# Patient Record
Sex: MALE | Race: ASIAN | NOT HISPANIC OR LATINO | URBAN - METROPOLITAN AREA
[De-identification: names, ages, dates, MRNs, and addresses within clinical notes are randomized per-mention and may not be internally consistent; named-entity substitution may affect disease eponyms.]

---

## 2020-01-07 ENCOUNTER — APPOINTMENT (RX ONLY)
Dept: URBAN - METROPOLITAN AREA CLINIC 158 | Facility: CLINIC | Age: 65
Setting detail: DERMATOLOGY
End: 2020-01-07

## 2020-01-07 DIAGNOSIS — L40.0 PSORIASIS VULGARIS: ICD-10-CM | Status: INADEQUATELY CONTROLLED

## 2020-01-07 PROCEDURE — 99203 OFFICE O/P NEW LOW 30 MIN: CPT

## 2020-01-07 PROCEDURE — ? COUNSELING

## 2020-01-07 PROCEDURE — ? PATIENT SPECIFIC COUNSELING

## 2020-01-07 PROCEDURE — ? ORDER TESTS

## 2020-01-07 ASSESSMENT — LOCATION DETAILED DESCRIPTION DERM
LOCATION DETAILED: RIGHT PROXIMAL PRETIBIAL REGION
LOCATION DETAILED: LEFT PROXIMAL POSTERIOR THIGH
LOCATION DETAILED: LEFT PROXIMAL PRETIBIAL REGION
LOCATION DETAILED: RIGHT DISTAL CALF
LOCATION DETAILED: RIGHT ELBOW
LOCATION DETAILED: RIGHT DISTAL POSTERIOR THIGH
LOCATION DETAILED: LEFT DISTAL CALF
LOCATION DETAILED: RIGHT BUTTOCK
LOCATION DETAILED: LEFT BUTTOCK
LOCATION DETAILED: LEFT ELBOW

## 2020-01-07 ASSESSMENT — BSA PSORIASIS: % BODY COVERED IN PSORIASIS: 30

## 2020-01-07 ASSESSMENT — PGA PSORIASIS: PGA PSORIASIS: MODERATE (MODERATE PLAQUE ELEVATION, MODERATE ERYTHEMA, COARSE SCALE PREDOMINATES)

## 2020-01-07 ASSESSMENT — LOCATION SIMPLE DESCRIPTION DERM
LOCATION SIMPLE: RIGHT PRETIBIAL REGION
LOCATION SIMPLE: LEFT POSTERIOR THIGH
LOCATION SIMPLE: RIGHT ELBOW
LOCATION SIMPLE: LEFT ELBOW
LOCATION SIMPLE: LEFT CALF
LOCATION SIMPLE: RIGHT POSTERIOR THIGH
LOCATION SIMPLE: RIGHT BUTTOCK
LOCATION SIMPLE: LEFT PRETIBIAL REGION
LOCATION SIMPLE: RIGHT CALF
LOCATION SIMPLE: LEFT BUTTOCK

## 2020-01-07 ASSESSMENT — LOCATION ZONE DERM
LOCATION ZONE: ARM
LOCATION ZONE: TRUNK
LOCATION ZONE: LEG

## 2020-01-07 NOTE — PROCEDURE: PATIENT SPECIFIC COUNSELING
Detail Level: Detailed
Patient had moved here from [place] in July 2019 and is here to establish dermatologic care.  He has a long-standing history of psoriasis.  He was most recently on Stelara for 2 years under the care of the a dermatologist in [place] with the last injection in June 2019.  His Stelara dose was 45mg and he weighs 160lb.  When he was on Stelara, the psoriasis was under very good control without any side effects.  Prior to Stelara, he had treatment with Enbrel, phototherapy, and a steroid ointment.  Exam show psoriasis plaques over the scalp, face, arms, legs, and back.  There is approximately 30% BSA involved.  Patient wishes to go back on Stelara. I will submit PA/Prescription form into [facility] specialty pharmacy.  Printed order given for CBC. CMP, and Quantiferon Gold TB test.  Return to clinic in 3 months assuming Stelara will be covered.  If [facility] is unable to service the patient, then will need to use [facility] per patient/wife.

## 2020-05-26 ENCOUNTER — APPOINTMENT (RX ONLY)
Dept: URBAN - METROPOLITAN AREA CLINIC 158 | Facility: CLINIC | Age: 65
Setting detail: DERMATOLOGY
End: 2020-05-26

## 2020-05-26 DIAGNOSIS — L40.0 PSORIASIS VULGARIS: ICD-10-CM | Status: WELL CONTROLLED

## 2020-05-26 PROCEDURE — 99213 OFFICE O/P EST LOW 20 MIN: CPT

## 2020-05-26 PROCEDURE — ? PATIENT SPECIFIC COUNSELING

## 2020-05-26 PROCEDURE — ? COUNSELING

## 2020-05-26 ASSESSMENT — LOCATION DETAILED DESCRIPTION DERM
LOCATION DETAILED: RIGHT BUTTOCK
LOCATION DETAILED: RIGHT ELBOW
LOCATION DETAILED: LEFT BUTTOCK
LOCATION DETAILED: LEFT ELBOW
LOCATION DETAILED: RIGHT DISTAL POSTERIOR THIGH
LOCATION DETAILED: LEFT PROXIMAL POSTERIOR THIGH
LOCATION DETAILED: LEFT DISTAL CALF
LOCATION DETAILED: RIGHT DISTAL CALF
LOCATION DETAILED: RIGHT PROXIMAL PRETIBIAL REGION
LOCATION DETAILED: LEFT PROXIMAL PRETIBIAL REGION

## 2020-05-26 ASSESSMENT — LOCATION SIMPLE DESCRIPTION DERM
LOCATION SIMPLE: RIGHT PRETIBIAL REGION
LOCATION SIMPLE: LEFT PRETIBIAL REGION
LOCATION SIMPLE: RIGHT CALF
LOCATION SIMPLE: LEFT ELBOW
LOCATION SIMPLE: LEFT POSTERIOR THIGH
LOCATION SIMPLE: RIGHT ELBOW
LOCATION SIMPLE: RIGHT POSTERIOR THIGH
LOCATION SIMPLE: LEFT CALF
LOCATION SIMPLE: LEFT BUTTOCK
LOCATION SIMPLE: RIGHT BUTTOCK

## 2020-05-26 ASSESSMENT — LOCATION ZONE DERM
LOCATION ZONE: ARM
LOCATION ZONE: TRUNK
LOCATION ZONE: LEG

## 2020-05-26 ASSESSMENT — PGA PSORIASIS: PGA PSORIASIS 2020: CLEAR

## 2020-05-26 NOTE — PROCEDURE: PATIENT SPECIFIC COUNSELING
Detail Level: Detailed
Carry forward from 1/7/2020: Patient had moved here from [place] in July 2019 and is here to establish dermatologic care.  He has a long-standing history of psoriasis.  He was most recently on Stelara for 2 years under the care of the a dermatologist in [place] with the last injection in June 2019.  His Stelara dose was 45mg and he weighs 160lb.  When he was on Stelara, the psoriasis was under very good control without any side effects.  Prior to Stelara, he had treatment with Enbrel, phototherapy, and a steroid ointment.  Exam show psoriasis plaques over the scalp, face, arms, legs, and back.  There is approximately 30% BSA involved.  Patient wishes to go back on Stelara. I will submit PA/Prescription form into [facility].  Printed order given for CBC. CMP, and Quantiferon Gold TB test.  Return to clinic in 3 months assuming Stelara will be covered.  If [employer] is unable to service the patient, then will need to use [facility] per patient/wife.\\n\\nTODAY's note (May 26 2020): Psoriasis has cleared since he restarted Stelara 45mg every 12 weeks back in Jan 2020.  Exam shows not active psoriasis lesions.  He denies any side effects including no illness, fever, or chills.  Labs from 1/17/2020 reviewed.  He is getting St[facility].  Stelara approved from 1/16/20 to 1/16/21 (see approval letter in attachement).  Return to clinic in 6 months.

## 2020-10-16 ENCOUNTER — NEW PATIENT (OUTPATIENT)
Dept: URBAN - METROPOLITAN AREA CLINIC 56 | Facility: CLINIC | Age: 65
End: 2020-10-16
Payer: OTHER GOVERNMENT

## 2020-10-16 DIAGNOSIS — H40.013 OPEN ANGLE WITH BORDERLINE FINDINGS, LOW RISK, BILATERAL: Primary | ICD-10-CM

## 2020-10-16 PROCEDURE — 92004 COMPRE OPH EXAM NEW PT 1/>: CPT | Performed by: OPTOMETRIST

## 2020-10-16 PROCEDURE — 92134 CPTRZ OPH DX IMG PST SGM RTA: CPT | Performed by: OPTOMETRIST

## 2020-10-16 PROCEDURE — 92250 FUNDUS PHOTOGRAPHY W/I&R: CPT | Performed by: OPTOMETRIST

## 2020-10-16 ASSESSMENT — KERATOMETRY
OD: 42.59
OS: 42.37

## 2020-10-16 ASSESSMENT — INTRAOCULAR PRESSURE
OD: 13
OS: 12

## 2020-10-16 ASSESSMENT — VISUAL ACUITY
OD: 20/20
OS: 20/20

## 2020-12-16 ENCOUNTER — APPOINTMENT (RX ONLY)
Dept: URBAN - METROPOLITAN AREA CLINIC 158 | Facility: CLINIC | Age: 65
Setting detail: DERMATOLOGY
End: 2020-12-16

## 2020-12-16 DIAGNOSIS — L40.0 PSORIASIS VULGARIS: ICD-10-CM | Status: WELL CONTROLLED

## 2020-12-16 PROCEDURE — 99213 OFFICE O/P EST LOW 20 MIN: CPT

## 2020-12-16 PROCEDURE — ? COUNSELING

## 2020-12-16 PROCEDURE — ? ORDER TESTS

## 2020-12-16 PROCEDURE — ? PATIENT SPECIFIC COUNSELING

## 2020-12-16 ASSESSMENT — LOCATION ZONE DERM: LOCATION ZONE: ARM

## 2020-12-16 ASSESSMENT — LOCATION SIMPLE DESCRIPTION DERM
LOCATION SIMPLE: RIGHT ELBOW
LOCATION SIMPLE: LEFT ELBOW

## 2020-12-16 ASSESSMENT — LOCATION DETAILED DESCRIPTION DERM
LOCATION DETAILED: RIGHT ELBOW
LOCATION DETAILED: LEFT ELBOW

## 2020-12-16 NOTE — PROCEDURE: PATIENT SPECIFIC COUNSELING
Detail Level: Detailed
Carry forward from 1/7/2020: Patient had moved here from [place] in July 2019 and is here to establish dermatologic care.  He has a long-standing history of psoriasis.  He was most recently on Stelara for 2 years under the care of the a dermatologist [place] with the last injection in June 2019.  His Stelara dose was 45mg and he weighs 160lb.  When he was on Stelara, the psoriasis was under very good control without any side effects.  Prior to Stelara, he had treatment with Enbrel, phototherapy, and a steroid ointment.  Exam show psoriasis plaques over the scalp, face, arms, legs, and back.  There is approximately 30% BSA involved.  Patient wishes to go back on Stelara. I will submit PA/Prescription form into [facility] specialty pharmacy.  Printed order given for CBC. CMP, and Quantiferon Gold TB test.  Return to clinic in 3 months assuming Stelara will be covered.  If [employer] is unable to service the patient, then will need to use [facility] per patient/wife.\\n\\nCarry forward from (May 26 2020): Psoriasis has cleared since he restarted Stelara 45mg every 12 weeks back in Jan 2020.  Exam shows not active psoriasis lesions.  He denies any side effects including no illness, fever, or chills.  Labs from 1/17/2020 reviewed.  He is getting Stelara via [facility] specialty pharmacy.  Stelara approved from 1/16/20 to 1/16/21 (see approval letter in attachement).  Return to clinic in 6 months.\\n\\nTODAY's note (Dec 16 2020): Psoriasis is well controlled on Stelara 45mg every 12 weeks.  He denies any side effects.  Exam just shows two small plaques on the elbows.  Printed order given for his yearly QuantiFERON-TB Gold Plus.  [facility] is [facility].  Return to clinic in 6 months.

## 2021-03-30 ENCOUNTER — RX ONLY (OUTPATIENT)
Age: 66
Setting detail: RX ONLY
End: 2021-03-30

## 2021-03-30 RX ORDER — USTEKINUMAB 45 MG/.5ML
1 INJECTION, SOLUTION SUBCUTANEOUS
Qty: 1 | Refills: 6 | Status: CANCELLED
Stop reason: CLARIF

## 2021-06-16 ENCOUNTER — APPOINTMENT (RX ONLY)
Dept: URBAN - METROPOLITAN AREA CLINIC 158 | Facility: CLINIC | Age: 66
Setting detail: DERMATOLOGY
End: 2021-06-16

## 2021-06-16 DIAGNOSIS — L40.0 PSORIASIS VULGARIS: ICD-10-CM | Status: WELL CONTROLLED

## 2021-06-16 PROCEDURE — ? STELARA MONITORING

## 2021-06-16 PROCEDURE — ? PATIENT SPECIFIC COUNSELING

## 2021-06-16 PROCEDURE — ? COUNSELING

## 2021-06-16 PROCEDURE — 99213 OFFICE O/P EST LOW 20 MIN: CPT

## 2021-06-16 PROCEDURE — ? ORDER TESTS

## 2021-06-16 ASSESSMENT — LOCATION DETAILED DESCRIPTION DERM: LOCATION DETAILED: RIGHT ANTERIOR MEDIAL MALLEOLUS

## 2021-06-16 ASSESSMENT — LOCATION ZONE DERM: LOCATION ZONE: LEG

## 2021-06-16 ASSESSMENT — LOCATION SIMPLE DESCRIPTION DERM: LOCATION SIMPLE: RIGHT ANKLE

## 2021-06-16 NOTE — PROCEDURE: PATIENT SPECIFIC COUNSELING
Detail Level: Detailed
Carry forward from 1/7/2020: Patient had moved here from [place] in July 2019 and is here to establish dermatologic care.  He has a long-standing history of psoriasis.  He was most recently on Stelara for 2 years under the care of the a dermatologist in [place] with the last injection in June 2019.  His Stelara dose was 45mg and he weighs 160lb.  When he was on Stelara, the psoriasis was under very good control without any side effects.  Prior to Stelara, he had treatment with Enbrel, phototherapy, and a steroid ointment.  Exam show psoriasis plaques over the scalp, face, arms, legs, and back.  There is approximately 30% BSA involved.  Patient wishes to go back on Stelara. I will submit PA/Prescription form into [facility] specialty pharmacy.  Printed order given for CBC. CMP, and Quantiferon Gold TB test.  Return to clinic in 3 months assuming Stelara will be covered.  If [employer] is unable to service the patient, then will need to use [facility] per patient/wife.\\n\\nCarry forward from (May 26 2020): Psoriasis has cleared since he restarted Stelara 45mg every 12 weeks back in Jan 2020.  Exam shows not active psoriasis lesions.  He denies any side effects including no illness, fever, or chills.  Labs from 1/17/2020 reviewed.  He is getting Stel[facility].  Stelara approved from 1/16/20 to 1/16/21 (see approval letter in attachement).  Return to clinic in 6 months.\\n\\nCarry forward from (Dec 16 2020): Psoriasis is well controlled on Stelara 45mg every 12 weeks.  He denies any side effects.  Exam just shows two small plaques on the elbows.  Printed order given for his yearly QuantiFERON-TB Gold Plus.  Patient is getting [facility].  Return to clinic in 6 months.\\n\\nTODAY's note (Jun 16 2021): Psoriasis is well controlled on Stelara 45mg every 12 weeks.  He denies any side effects.  Exam just shows small plaques on the right ankle.  Printed order given for his yearly QuantiFERON-TB Gold Plus; he did not get the lab test drawn after last visit.  Patient is getting Stel[facility].  Return to clinic in 6 months

## 2022-01-28 ENCOUNTER — APPOINTMENT (RX ONLY)
Dept: URBAN - METROPOLITAN AREA CLINIC 158 | Facility: CLINIC | Age: 67
Setting detail: DERMATOLOGY
End: 2022-01-28

## 2022-01-28 DIAGNOSIS — L40.0 PSORIASIS VULGARIS: ICD-10-CM

## 2022-01-28 PROCEDURE — ? STELARA MONITORING

## 2022-01-28 PROCEDURE — ? COUNSELING

## 2022-01-28 PROCEDURE — 96372 THER/PROPH/DIAG INJ SC/IM: CPT

## 2022-01-28 PROCEDURE — ? STELARA INJECTION

## 2022-01-28 PROCEDURE — ? PATIENT SPECIFIC COUNSELING

## 2022-01-28 ASSESSMENT — LOCATION SIMPLE DESCRIPTION DERM
LOCATION SIMPLE: ABDOMEN
LOCATION SIMPLE: RIGHT ANKLE

## 2022-01-28 ASSESSMENT — LOCATION ZONE DERM
LOCATION ZONE: LEG
LOCATION ZONE: TRUNK

## 2022-01-28 ASSESSMENT — LOCATION DETAILED DESCRIPTION DERM
LOCATION DETAILED: LEFT LATERAL ABDOMEN
LOCATION DETAILED: RIGHT ANTERIOR MEDIAL MALLEOLUS

## 2022-01-28 NOTE — PROCEDURE: PATIENT SPECIFIC COUNSELING
Detail Level: Detailed
Carry forward from 1/7/2020: Patient had moved here from [place] in July 2019 and is here to establish dermatologic care.  He has a long-standing history of psoriasis.  He was most recently on Stelara for 2 years under the care of the a dermatologist in [place] with the last injection in June 2019.  His Stelara dose was 45mg and he weighs 160lb.  When he was on Stelara, the psoriasis was under very good control without any side effects.  Prior to Stelara, he had treatment with Enbrel, phototherapy, and a steroid ointment.  Exam show psoriasis plaques over the scalp, face, arms, legs, and back.  There is approximately 30% BSA involved.  Patient wishes to go back on Stelara. I will submit PA/Prescription form into [facility] specialty pharmacy.  Printed order given for CBC. CMP, and Quantiferon Gold TB test.  Return to clinic in 3 months assuming Stelara will be covered.  If [employer] is unable to service the patient, then will need to use [facility] per patient/wife.\\n\\nCarry forward from (May 26 2020): Psoriasis has cleared since he restarted Stelara 45mg every 12 weeks back in Jan 2020.  Exam shows not active psoriasis lesions.  He denies any side effects including no illness, fever, or chills.  Labs from 1/17/2020 reviewed.  He is getting Stelara via [facility] specialty pharmacy.  Stelara approved from 1/16/20 to 1/16/21 (see approval letter in attachement).  Return to clinic in 6 months.\\n\\nCarry forward from (Dec 16 2020): Psoriasis is well controlled on Stelara 45mg every 12 weeks.  He denies any side effects.  Exam just shows two small plaques on the elbows.  Printed order given for his yearly QuantiFERON-TB Gold Plus.  Patient is getting [facility].  Return to clinic in 6 months.\\n\\nCarry forward from (Jun 16 2021): Psoriasis is well controlled on Stelara 45mg every 12 weeks.  He denies any side effects.  Exam just shows small plaques on the right ankle.  Printed order given for his yearly QuantiFERON-TB Gold Plus; he did not get the lab test drawn after last visit.  Patient is getting Stel[facility].  Return to clinic in 6 months\\n\\Adilson's note (Jan 28 2022): Psoriasis is well controlled on Stelara 45mg every 12 weeks.  He denies any side effects.  Exam just shows no active lesions.  QuantiFERON-TB Gold Plus was negative on 6/16/2021. Due to a change in his insurance, Stelara now has to be sent here to the office for injection in order to be covered by his insurance.  The medication is being [facility]; medication was shipped here earlier this week.  Return to clinic in 3 months.  I instructed patient and [facility] to [facility] to arrange [facility] of the medication prior to the next visit.

## 2022-01-28 NOTE — PROCEDURE: STELARA INJECTION
Was The Medication Purchased By The Clinic?: No
Ndc (45mg Syringe): [address]-04
Stelara Amount: 45 mg
Render If Medication Purchased By Clinic In Visit Note?: Yes
Consent: The risks of pain and injection site reactions were reviewed with the patient prior to the injection.
Administered By (Optional): Dr. Cruz
Detail Level: None
Ndc (90mg Syringe): [address]-49
J-Code:

## 2022-03-18 ENCOUNTER — OFFICE VISIT (OUTPATIENT)
Dept: URBAN - METROPOLITAN AREA CLINIC 56 | Facility: CLINIC | Age: 67
End: 2022-03-18
Payer: MEDICARE

## 2022-03-18 DIAGNOSIS — H11.041 PERIPHERAL PTERYGIUM, STATIONARY, RIGHT EYE: ICD-10-CM

## 2022-03-18 DIAGNOSIS — H04.123 DRY EYE SYNDROME OF BILATERAL LACRIMAL GLANDS: ICD-10-CM

## 2022-03-18 DIAGNOSIS — H25.13 AGE-RELATED NUCLEAR CATARACT, BILATERAL: ICD-10-CM

## 2022-03-18 DIAGNOSIS — H43.813 VITREOUS DEGENERATION, BILATERAL: ICD-10-CM

## 2022-03-18 PROCEDURE — 92133 CPTRZD OPH DX IMG PST SGM ON: CPT | Performed by: OPTOMETRIST

## 2022-03-18 PROCEDURE — 92014 COMPRE OPH EXAM EST PT 1/>: CPT | Performed by: OPTOMETRIST

## 2022-03-18 ASSESSMENT — VISUAL ACUITY
OD: 20/20
OS: 20/20

## 2022-03-18 ASSESSMENT — KERATOMETRY
OD: 2149.18
OS: 42.37

## 2022-03-18 ASSESSMENT — INTRAOCULAR PRESSURE
OS: 13
OD: 14

## 2022-03-18 NOTE — IMPRESSION/PLAN
Impression: Dry eye syndrome of bilateral lacrimal glands: H04.123. Plan: Continue ATs QID OU Start dry warm compresses daily for 5 min.

## 2022-03-18 NOTE — IMPRESSION/PLAN
Impression: Open angle with borderline findings, low risk, bilateral
  (-) FHx Plan: IOP and testing are stable. Will not start treatment, see yearly. 
RTC 1 year CE with OCT RNFL

## 2022-03-18 NOTE — IMPRESSION/PLAN
Impression: Peripheral pterygium, stationary, right eye Plan: Not affecting vision. Recommend UV protection and ATs. Monitor.

## 2022-04-29 ENCOUNTER — APPOINTMENT (RX ONLY)
Dept: URBAN - METROPOLITAN AREA CLINIC 158 | Facility: CLINIC | Age: 67
Setting detail: DERMATOLOGY
End: 2022-04-29

## 2022-04-29 DIAGNOSIS — L82.1 OTHER SEBORRHEIC KERATOSIS: ICD-10-CM

## 2022-04-29 DIAGNOSIS — L40.0 PSORIASIS VULGARIS: ICD-10-CM

## 2022-04-29 PROCEDURE — ? STELARA MONITORING

## 2022-04-29 PROCEDURE — ? STELARA INJECTION

## 2022-04-29 PROCEDURE — ? COUNSELING

## 2022-04-29 PROCEDURE — 99213 OFFICE O/P EST LOW 20 MIN: CPT

## 2022-04-29 PROCEDURE — ? PATIENT SPECIFIC COUNSELING

## 2022-04-29 ASSESSMENT — LOCATION DETAILED DESCRIPTION DERM
LOCATION DETAILED: LEFT INFERIOR LATERAL UPPER BACK
LOCATION DETAILED: RIGHT DISTAL POSTERIOR UPPER ARM
LOCATION DETAILED: LEFT INFERIOR CENTRAL MALAR CHEEK
LOCATION DETAILED: LEFT SUPERIOR UPPER BACK
LOCATION DETAILED: LEFT PROXIMAL POSTERIOR UPPER ARM
LOCATION DETAILED: RIGHT MID-UPPER BACK
LOCATION DETAILED: LEFT LATERAL ABDOMEN
LOCATION DETAILED: RIGHT INFERIOR UPPER BACK
LOCATION DETAILED: RIGHT ANTERIOR PROXIMAL THIGH
LOCATION DETAILED: EPIGASTRIC SKIN
LOCATION DETAILED: RIGHT SUPERIOR PARIETAL SCALP
LOCATION DETAILED: RIGHT MEDIAL INFERIOR CHEST
LOCATION DETAILED: LEFT ANTERIOR PROXIMAL THIGH
LOCATION DETAILED: RIGHT INFERIOR CENTRAL MALAR CHEEK
LOCATION DETAILED: RIGHT ANTERIOR MEDIAL MALLEOLUS

## 2022-04-29 ASSESSMENT — LOCATION SIMPLE DESCRIPTION DERM
LOCATION SIMPLE: LEFT UPPER ARM
LOCATION SIMPLE: RIGHT THIGH
LOCATION SIMPLE: ABDOMEN
LOCATION SIMPLE: LEFT THIGH
LOCATION SIMPLE: CHEST
LOCATION SIMPLE: RIGHT UPPER ARM
LOCATION SIMPLE: RIGHT UPPER BACK
LOCATION SIMPLE: LEFT CHEEK
LOCATION SIMPLE: RIGHT CHEEK
LOCATION SIMPLE: LEFT UPPER BACK
LOCATION SIMPLE: SCALP
LOCATION SIMPLE: RIGHT ANKLE

## 2022-04-29 ASSESSMENT — LOCATION ZONE DERM
LOCATION ZONE: TRUNK
LOCATION ZONE: FACE
LOCATION ZONE: LEG
LOCATION ZONE: SCALP
LOCATION ZONE: ARM

## 2022-04-29 NOTE — PROCEDURE: STELARA INJECTION
Was The Medication Purchased By The Clinic?: No
Ndc (45mg Syringe): [address]-32
Stelara Amount: 45 mg
Render If Medication Purchased By Clinic In Visit Note?: Yes
Consent: The risks of pain and injection site reactions were reviewed with the patient prior to the injection.
Administered By (Optional): Dr. Cruz
Lot # (Optional): [id number]
Expiration Date (Optional): 9/2024
Detail Level: None
Ndc (90mg Syringe): [address]-57
J-Code:

## 2022-04-29 NOTE — PROCEDURE: PATIENT SPECIFIC COUNSELING
Detail Level: Detailed
Carry forward from 1/7/2020: Patient had moved here from [place] in July 2019 and is here to establish dermatologic care.  He has a long-standing history of psoriasis.  He was most recently on Stelara for 2 years under the care of the a dermatologist in [place] with the last injection in June 2019.  His Stelara dose was 45mg and he weighs 160lb.  When he was on Stelara, the psoriasis was under very good control without any side effects.  Prior to Stelara, he had treatment with Enbrel, phototherapy, and a steroid ointment.  Exam show psoriasis plaques over the scalp, face, arms, legs, and back.  There is approximately 30% BSA involved.  Patient wishes to go back on Stelara. I will submit PA/Prescription form into [facility] specialty pharmacy.  Printed order given for CBC. CMP, and Quantiferon Gold TB test.  Return to clinic in 3 months assuming Stelara will be covered.  If [employer] is unable to service the patient, then will need to use [facility] per patient/wife.\\n\\nCarry forward from (May 26 2020): Psoriasis has cleared since he restarted Stelara 45mg every 12 weeks back in Jan 2020.  Exam shows not active psoriasis lesions.  He denies any side effects including no illness, fever, or chills.  Labs from 1/17/2020 reviewed.  He is getting Stelara via [facility] specialty pharmacy.  Stelara approved from 1/16/20 to 1/16/21 (see approval letter in attachement).  Return to clinic in 6 months.\\n\\nCarry forward from (Dec 16 2020): Psoriasis is well controlled on Stelara 45mg every 12 weeks.  He denies any side effects.  Exam just shows two small plaques on the elbows.  Printed order given for his yearly QuantiFERON-TB Gold Plus.  Patient is getting [facility].  Return to clinic in 6 months.\\n\\nCarry forward from (Jun 16 2021): Psoriasis is well controlled on Stelara 45mg every 12 weeks.  He denies any side effects.  Exam just shows small plaques on the right ankle.  Printed order given for his yearly QuantiFERON-TB Gold Plus; he did not get the lab test drawn after last visit.  Patient is getting Stel[facility].  Return to clinic in 6 months\\n\\nCarry forward from (Jan 28 2022): Psoriasis is well controlled on Stelara 45mg every 12 weeks.  He denies any side effects.  Exam just shows no active lesions.  QuantiFERON-TB Gold Plus was negative on 6/16/2021. Due to a change in his insurance, Stelara now has to be sent here to the office for injection in order to be covered by his insurance.  The medication is being supplied [facility]; medication was shipped here earlier this week.  Return to clinic in 3 months.  I instructed patient and wife to [facility] to arrange shipment of the medication prior to the next visit.\\n\\nTODAY's note (Apr 29 2022): Patient is here for his Stelara injection.  Condition is well controlled with no active psoriasis plaques on exam. Patient instructed to contact [facility] to have medication shipped to our office prior to follow-up appointment in 12 weeks.

## 2022-07-29 ENCOUNTER — APPOINTMENT (RX ONLY)
Dept: URBAN - METROPOLITAN AREA CLINIC 158 | Facility: CLINIC | Age: 67
Setting detail: DERMATOLOGY
End: 2022-07-29

## 2022-07-29 DIAGNOSIS — L40.0 PSORIASIS VULGARIS: ICD-10-CM

## 2022-07-29 PROCEDURE — ? STELARA MONITORING

## 2022-07-29 PROCEDURE — 99213 OFFICE O/P EST LOW 20 MIN: CPT

## 2022-07-29 PROCEDURE — ? COUNSELING

## 2022-07-29 PROCEDURE — ? PATIENT SPECIFIC COUNSELING

## 2022-07-29 PROCEDURE — ? STELARA INJECTION

## 2022-07-29 ASSESSMENT — LOCATION SIMPLE DESCRIPTION DERM
LOCATION SIMPLE: RIGHT UPPER ARM
LOCATION SIMPLE: LEFT UPPER ARM
LOCATION SIMPLE: RIGHT THIGH
LOCATION SIMPLE: ABDOMEN
LOCATION SIMPLE: LEFT THIGH
LOCATION SIMPLE: CHEST

## 2022-07-29 ASSESSMENT — LOCATION ZONE DERM
LOCATION ZONE: LEG
LOCATION ZONE: ARM
LOCATION ZONE: TRUNK

## 2022-07-29 ASSESSMENT — LOCATION DETAILED DESCRIPTION DERM
LOCATION DETAILED: RIGHT MEDIAL INFERIOR CHEST
LOCATION DETAILED: LEFT ANTERIOR PROXIMAL THIGH
LOCATION DETAILED: PERIUMBILICAL SKIN
LOCATION DETAILED: RIGHT ANTERIOR PROXIMAL THIGH
LOCATION DETAILED: RIGHT DISTAL POSTERIOR UPPER ARM
LOCATION DETAILED: LEFT PROXIMAL POSTERIOR UPPER ARM
LOCATION DETAILED: EPIGASTRIC SKIN

## 2022-07-29 NOTE — PROCEDURE: PATIENT SPECIFIC COUNSELING
Detail Level: Detailed
Carry forward from 1/7/2020: Patient had moved here from [place] in July 2019 and is here to establish dermatologic care.  He has a long-standing history of psoriasis.  He was most recently on Stelara for 2 years under the care of the a dermatologist in [place] with the last injection in June 2019.  His Stelara dose was 45mg and he weighs 160lb.  When he was on Stelara, the psoriasis was under very good control without any side effects.  Prior to Stelara, he had treatment with Enbrel, phototherapy, and a steroid ointment.  Exam show psoriasis plaques over the scalp, face, arms, legs, and back.  There is approximately 30% BSA involved.  Patient wishes to go back on Stelara. I will submit PA/Prescription form into [facility] specialty pharmacy.  Printed order given for CBC. CMP, and Quantiferon Gold TB test.  Return to clinic in 3 months assuming Stelara will be covered.  If [facility] is unable to service the patient, then will need to use [facility] per patient/wife.\\n\\nCarry forward from (May 26 2020): Psoriasis has cleared since he restarted Stelara 45mg every 12 weeks back in Jan 2020.  Exam shows not active psoriasis lesions.  He denies any side effects including no illness, fever, or chills.  Labs from 1/17/2020 reviewed.  He is getting Stelara via [facility] specialty pharmacy.  Stelara approved from 1/16/20 to 1/16/21 (see approval letter in attachement).  Return to clinic in 6 months.\\n\\nCarry forward from (Dec 16 2020): Psoriasis is well controlled on Stelara 45mg every 12 weeks.  He denies any side effects.  Exam just shows two small plaques on the elbows.  Printed order given for his yearly QuantiFERON-TB Gold Plus.  Patient is getting [facility].  Return to clinic in 6 months.\\n\\nCarry forward from (Jun 16 2021): Psoriasis is well controlled on Stelara 45mg every 12 weeks.  He denies any side effects.  Exam just shows small plaques on the right ankle.  Printed order given for his yearly QuantiFERON-TB Gold Plus; he did not get the lab test drawn after last visit.  Patient is getting Stel[facility].  Return to clinic in 6 months\\n\\nCarry forward from (Jan 28 2022): Psoriasis is well controlled on Stelara 45mg every 12 weeks.  He denies any side effects.  Exam just shows no active lesions.  QuantiFERON-TB Gold Plus was negative on 6/16/2021. Due to a change in his insurance, Stelara now has to be sent here to the office for injection in order to be covered by his insurance.  The medication is being [facility]; medication was shipped here earlier this week.  Return to clinic in 3 months.  I instructed patient and wife to [facility] to arrange shipment of the medication prior to the next visit.\\n\\nCarry forward from (Apr 29 2022): Patient is here for his Stelara injection.  Condition is well controlled with no active psoriasis plaques on exam. Patient instructed to contact [facility] to have medication shipped to our office prior to follow-up appointment in 12 weeks.\\n\\nTODAY's note (Jul 29 2022): The patient is here for his Stelara injection.  Condition is well controlled with no active psoriasis plaques on the exam. Patient was instructed to contact [facility] to have medication shipped to our office prior to follow-up appointment in 12 weeks.  Patient reports that he had a \"skin reaction\" that he described as hives on the arms and abdomen that appeared 10 days ago while he was in [place].  He went to the ED and was given hydroxyzine.  The eruption has resolved and there is no active hives on exam.

## 2022-07-29 NOTE — PROCEDURE: STELARA INJECTION
Was The Medication Purchased By The Clinic?: No
Ndc (45mg Syringe): [address]-57
Stelara Amount: 45 mg
Render If Medication Purchased By Clinic In Visit Note?: Yes
Consent: The risks of pain and injection site reactions were reviewed with the patient prior to the injection.
Administered By (Optional): Dr. Cruz
Lot # (Optional): [id number]
Expiration Date (Optional): 10/2024
Detail Level: None
Ndc (90mg Syringe): [address]-49
J-Code:

## 2022-10-27 ENCOUNTER — APPOINTMENT (RX ONLY)
Dept: URBAN - METROPOLITAN AREA CLINIC 158 | Facility: CLINIC | Age: 67
Setting detail: DERMATOLOGY
End: 2022-10-27

## 2022-10-27 DIAGNOSIS — L40.0 PSORIASIS VULGARIS: ICD-10-CM

## 2022-10-27 PROCEDURE — ? PATIENT SPECIFIC COUNSELING

## 2022-10-27 PROCEDURE — ? STELARA MONITORING

## 2022-10-27 PROCEDURE — 99213 OFFICE O/P EST LOW 20 MIN: CPT

## 2022-10-27 PROCEDURE — ? STELARA INJECTION

## 2022-10-27 PROCEDURE — ? COUNSELING

## 2022-10-27 ASSESSMENT — LOCATION ZONE DERM
LOCATION ZONE: TRUNK
LOCATION ZONE: LEG
LOCATION ZONE: ARM

## 2022-10-27 ASSESSMENT — LOCATION SIMPLE DESCRIPTION DERM
LOCATION SIMPLE: RIGHT PRETIBIAL REGION
LOCATION SIMPLE: LEFT FOREARM
LOCATION SIMPLE: ABDOMEN

## 2022-10-27 ASSESSMENT — LOCATION DETAILED DESCRIPTION DERM
LOCATION DETAILED: LEFT PROXIMAL DORSAL FOREARM
LOCATION DETAILED: RIGHT DISTAL PRETIBIAL REGION
LOCATION DETAILED: RIGHT PROXIMAL PRETIBIAL REGION
LOCATION DETAILED: PERIUMBILICAL SKIN

## 2022-10-27 NOTE — PROCEDURE: STELARA INJECTION
Was The Medication Purchased By The Clinic?: No
Ndc (45mg Syringe): [address]-13
Stelara Amount: 45 mg
Render If Medication Purchased By Clinic In Visit Note?: Yes
Consent: The risks of pain and injection site reactions were reviewed with the patient prior to the injection.
Administered By (Optional): Dr. Cruz
Lot # (Optional): [id number]
Expiration Date (Optional): 10/2024
Detail Level: None
Ndc (90mg Syringe): [address]-00
J-Code:

## 2022-10-27 NOTE — PROCEDURE: PATIENT SPECIFIC COUNSELING
Detail Level: Detailed
Carry forward from 1/7/2020: Patient had moved here from [place] in July 2019 and is here to establish dermatologic care.  He has a long-standing history of psoriasis.  He was most recently on Stelara for 2 years under the care of the a dermatologist in [place] with the last injection in June 2019.  His Stelara dose was 45mg and he weighs 160lb.  When he was on Stelara, the psoriasis was under very good control without any side effects.  Prior to Stelara, he had treatment with Enbrel, phototherapy, and a steroid ointment.  Exam show psoriasis plaques over the scalp, face, arms, legs, and back.  There is approximately 30% BSA involved.  Patient wishes to go back on Stelara. I will submit PA/Prescription form into [facility] specialty pharmacy.  Printed order given for CBC. CMP, and Quantiferon Gold TB test.  Return to clinic in 3 months assuming Stelara will be covered.  If [employer] is unable to service the patient, then will need to use [facility] per patient/wife.\\n\\nCarry forward from (May 26 2020): Psoriasis has cleared since he restarted Stelara 45mg every 12 weeks back in Jan 2020.  Exam shows not active psoriasis lesions.  He denies any side effects including no illness, fever, or chills.  Labs from 1/17/2020 reviewed.  He is getting Stelara via [facility] specialty pharmacy.  Stelara approved from 1/16/20 to 1/16/21 (see approval letter in attachement).  Return to clinic in 6 months.\\n\\nCarry forward from (Dec 16 2020): Psoriasis is well controlled on Stelara 45mg every 12 weeks.  He denies any side effects.  Exam just shows two small plaques on the elbows.  Printed order given for his yearly QuantiFERON-TB Gold Plus.  Patient is getting [facility].  Return to clinic in 6 months.\\n\\nCarry forward from (Jun 16 2021): Psoriasis is well controlled on Stelara 45mg every 12 weeks.  He denies any side effects.  Exam just shows small plaques on the right ankle.  Printed order given for his yearly QuantiFERON-TB Gold Plus; he did not get the lab test drawn after last visit.  Patient is getting St[facility].  Return to clinic in 6 months\\n\\nCarry forward from (Jan 28 2022): Psoriasis is well controlled on Stelara 45mg every 12 weeks.  He denies any side effects.  Exam just shows no active lesions.  QuantiFERON-TB Gold Plus was negative on 6/16/2021. Due to a change in his insurance, Stelara now has to be sent here to the office for injection in order to be covered by his insurance.  The medication is being [facility]; medication was shipped here earlier this week.  Return to clinic in 3 months.  I instructed patient and [facility] to [facility] to arrange shipment of the medication prior to the next visit.\\n\\nCarry forward from (Apr 29 2022): Patient is here for his Stelara injection.  Condition is well controlled with no active psoriasis plaques on exam. Patient instructed to [facility] to have medication shipped to our office prior to follow-up appointment in 12 weeks.\\n\\nCarry forward from (Jul 29 2022): The patient is here for his Stelara injection.  Condition is well controlled with no active psoriasis plaques on the exam. Patient was instructed to [facility] to have medication shipped to our office prior to follow-up appointment in 12 weeks.  Patient reports that he had a \"skin reaction\" that he described as hives on the arms and abdomen that appeared 10 days ago while he was in [place].  He went to the ED and was given hydroxyzine.  The eruption has resolved and there is no active hives on exam.\\n\\nTODAY's note (Oct 27 2022): The patient is here for his Stelara injection.  Condition is well controlled with limited active psoriasis plaques on the left forearm and right lower leg. Patient was instructed to [facility] to have medication shipped to our office prior to follow-up appointment in 12 weeks.

## 2023-01-27 ENCOUNTER — APPOINTMENT (RX ONLY)
Dept: URBAN - METROPOLITAN AREA CLINIC 158 | Facility: CLINIC | Age: 68
Setting detail: DERMATOLOGY
End: 2023-01-27

## 2023-01-27 DIAGNOSIS — T07XXXA ABRASION OR FRICTION BURN OF OTHER, MULTIPLE, AND UNSPECIFIED SITES, WITHOUT MENTION OF INFECTION: ICD-10-CM

## 2023-01-27 DIAGNOSIS — L40.0 PSORIASIS VULGARIS: ICD-10-CM

## 2023-01-27 PROBLEM — S00.01XA ABRASION OF SCALP, INITIAL ENCOUNTER: Status: ACTIVE | Noted: 2023-01-27

## 2023-01-27 PROCEDURE — ? PATIENT SPECIFIC COUNSELING

## 2023-01-27 PROCEDURE — 99213 OFFICE O/P EST LOW 20 MIN: CPT

## 2023-01-27 PROCEDURE — ? STELARA INJECTION

## 2023-01-27 PROCEDURE — ? COUNSELING

## 2023-01-27 PROCEDURE — ? STELARA MONITORING

## 2023-01-27 ASSESSMENT — LOCATION SIMPLE DESCRIPTION DERM
LOCATION SIMPLE: RIGHT PRETIBIAL REGION
LOCATION SIMPLE: LEFT FOREARM
LOCATION SIMPLE: ABDOMEN
LOCATION SIMPLE: SCALP

## 2023-01-27 ASSESSMENT — LOCATION ZONE DERM
LOCATION ZONE: ARM
LOCATION ZONE: SCALP
LOCATION ZONE: LEG
LOCATION ZONE: TRUNK

## 2023-01-27 ASSESSMENT — LOCATION DETAILED DESCRIPTION DERM
LOCATION DETAILED: LEFT PROXIMAL DORSAL FOREARM
LOCATION DETAILED: RIGHT PROXIMAL PRETIBIAL REGION
LOCATION DETAILED: PERIUMBILICAL SKIN
LOCATION DETAILED: RIGHT SUPERIOR PARIETAL SCALP
LOCATION DETAILED: RIGHT DISTAL PRETIBIAL REGION

## 2023-01-27 NOTE — PROCEDURE: COUNSELING
Detail Level: Generalized
Quality 410: Psoriasis Clinical Response To Oral Systemic Or Biologic Mediations: Psoriasis Assessment Tool Documented, Met Specified Benchmark
Quality 337: Tuberculosis Prevention For Psoriasis And Psoriatic Arthritis Patients On A Biological Immune Response Modifier: Patient has a documented negative annual TB screening.
Patient Specific Counseling (Will Not Stick From Patient To Patient): \\nSample of Aquaphor ointment given.
Detail Level: Detailed

## 2023-01-27 NOTE — PROCEDURE: STELARA INJECTION
Was The Medication Purchased By The Clinic?: No
Ndc (45mg Syringe): [address]-11
Stelara Amount: 45 mg
Render If Medication Purchased By Clinic In Visit Note?: Yes
Consent: The risks of pain and injection site reactions were reviewed with the patient prior to the injection.
Administered By (Optional): Dr. Cruz
Lot # (Optional): [id number]
Expiration Date (Optional): 05/2025
Detail Level: None
Ndc (90mg Syringe): [address]-99
J-Code:

## 2023-01-27 NOTE — PROCEDURE: PATIENT SPECIFIC COUNSELING
Detail Level: Detailed
Carry forward from 1/7/2020: Patient had moved here from [place] in July 2019 and is here to establish dermatologic care.  He has a long-standing history of psoriasis.  He was most recently on Stelara for 2 years under the care of the a dermatologist in [place] with the last injection in June 2019.  His Stelara dose was 45mg and he weighs 160lb.  When he was on Stelara, the psoriasis was under very good control without any side effects.  Prior to Stelara, he had treatment with Enbrel, phototherapy, and a steroid ointment.  Exam show psoriasis plaques over the scalp, face, arms, legs, and back.  There is approximately 30% BSA involved.  Patient wishes to go back on Stelara. I will submit PA/Prescription form into [facility] specialty pharmacy.  Printed order given for CBC. CMP, and Quantiferon Gold TB test.  Return to clinic in 3 months assuming Stelara will be covered.  If [facility] is unable to service the patient, then will need to use [facility] per patient/wife.\\n\\nCarry forward from (May 26 2020): Psoriasis has cleared since he restarted Stelara 45mg every 12 weeks back in Jan 2020.  Exam shows not active psoriasis lesions.  He denies any side effects including no illness, fever, or chills.  Labs from 1/17/2020 reviewed.  He is getting Stelara via [facility] specialty pharmacy.  Stelara approved from 1/16/20 to 1/16/21 (see approval letter in attachement).  Return to clinic in 6 months.\\n\\nCarry forward from (Dec 16 2020): Psoriasis is well controlled on Stelara 45mg every 12 weeks.  He denies any side effects.  Exam just shows two small plaques on the elbows.  Printed order given for his yearly QuantiFERON-TB Gold Plus.  Patient is [facility].  Return to clinic in 6 months.\\n\\nCarry forward from (Jun 16 2021): Psoriasis is well controlled on Stelara 45mg every 12 weeks.  He denies any side effects.  Exam just shows small plaques on the right ankle.  Printed order given for his yearly QuantiFERON-TB Gold Plus; he did not get the lab test drawn after last visit.  Patient is getting [facility].  Return to clinic in 6 months\\n\\nCarry forward from (Jan 28 2022): Psoriasis is well controlled on Stelara 45mg every 12 weeks.  He denies any side effects.  Exam just shows no active lesions.  QuantiFERON-TB Gold Plus was negative on 6/16/2021. Due to a change in his insurance, Stelara now has to be sent here to the office for injection in order to be covered by his insurance.  The medication is being supplied [facility]; medication was shipped here earlier this week.  Return to clinic in 3 months.  I instructed [facility] and [facility] to [facility] of the medication prior to the next visit.\\n\\nCarry forward from (Apr 29 2022): Patient is here for his Stelara injection.  Condition is well controlled with no active psoriasis plaques on exam. Patient instructed to contact [facility] to have medication shipped to our office prior to follow-up appointment in 12 weeks.\\n\\nCarry forward from (Jul 29 2022): The patient is here for his Stelara injection.  Condition is well controlled with no active psoriasis plaques on the exam. Patient was instructed to contact [facility] to have medication shipped to our office prior to follow-up appointment in 12 weeks.  Patient reports that he had a \"skin reaction\" that he described as hives on the arms and abdomen that appeared 10 days ago while he was in [place].  He went to the ED and was given hydroxyzine.  The eruption has resolved and there is no active hives on exam.\\n\\nCarry forward from (Oct 27 2022): The patient is here for his Stelara injection.  Condition is well controlled with limited active psoriasis plaques on the left forearm and right lower leg. Patient was instructed to [facility] to have medication shipped to our office prior to follow-up appointment in 12 weeks.\\n\\nTODAY's note (Jan 27 2023): The patient is here for his Stelara injection.  Condition is well controlled with no active psoriasis plaques. Patient was instructed to contact [facility] to have medication shipped to our office prior to follow-up appointment in 12 weeks. Will plan to get QuantiFERON-TB Gold Plus at next visit.

## 2023-04-20 ENCOUNTER — APPOINTMENT (RX ONLY)
Dept: URBAN - METROPOLITAN AREA CLINIC 158 | Facility: CLINIC | Age: 68
Setting detail: DERMATOLOGY
End: 2023-04-20

## 2023-04-20 DIAGNOSIS — L40.0 PSORIASIS VULGARIS: ICD-10-CM

## 2023-04-20 PROCEDURE — ? PATIENT SPECIFIC COUNSELING

## 2023-04-20 PROCEDURE — ? ORDER TESTS

## 2023-04-20 PROCEDURE — 99213 OFFICE O/P EST LOW 20 MIN: CPT

## 2023-04-20 PROCEDURE — ? STELARA MONITORING

## 2023-04-20 PROCEDURE — ? STELARA INJECTION

## 2023-04-20 PROCEDURE — ? COUNSELING

## 2023-04-20 ASSESSMENT — LOCATION DETAILED DESCRIPTION DERM
LOCATION DETAILED: LEFT PROXIMAL DORSAL FOREARM
LOCATION DETAILED: PERIUMBILICAL SKIN
LOCATION DETAILED: RIGHT DISTAL PRETIBIAL REGION
LOCATION DETAILED: RIGHT PROXIMAL PRETIBIAL REGION

## 2023-04-20 ASSESSMENT — LOCATION SIMPLE DESCRIPTION DERM
LOCATION SIMPLE: ABDOMEN
LOCATION SIMPLE: LEFT FOREARM
LOCATION SIMPLE: RIGHT PRETIBIAL REGION

## 2023-04-20 ASSESSMENT — LOCATION ZONE DERM
LOCATION ZONE: LEG
LOCATION ZONE: ARM
LOCATION ZONE: TRUNK

## 2023-04-20 NOTE — PROCEDURE: ORDER TESTS
Bill For Surgical Tray: no
Performing Laboratory: 0
Billing Type: Third-Party Bill
Expected Date Of Service: 04/20/2023

## 2023-04-20 NOTE — PROCEDURE: PATIENT SPECIFIC COUNSELING
Detail Level: Detailed
Carry forward from 1/7/2020: Patient had moved here from [place] in July 2019 and is here to establish dermatologic care.  He has a long-standing history of psoriasis.  He was most recently on Stelara for 2 years under the care of the a dermatologist in [place] with the last injection in June 2019.  His Stelara dose was 45mg and he weighs 160lb.  When he was on Stelara, the psoriasis was under very good control without any side effects.  Prior to Stelara, he had treatment with Enbrel, phototherapy, and a steroid ointment.  Exam show psoriasis plaques over the scalp, face, arms, legs, and back.  There is approximately 30% BSA involved.  Patient wishes to go back on Stelara. I will submit PA/Prescription form into [facility] specialty pharmacy.  Printed order given for CBC. CMP, and Quantiferon Gold TB test.  Return to clinic in 3 months assuming Stelara will be covered.  If [employer] is unable to service the patient, then will need to use [facility] per patient/wife.\\n\\nCarry forward from (May 26 2020): Psoriasis has cleared since he restarted Stelara 45mg every 12 weeks back in Jan 2020.  Exam shows not active psoriasis lesions.  He denies any side effects including no illness, fever, or chills.  Labs from 1/17/2020 reviewed.  He is getting Stelara via [facility] specialty pharmacy.  Stelara approved from 1/16/20 to 1/16/21 (see approval letter in attachement).  Return to clinic in 6 months.\\n\\nCarry forward from (Dec 16 2020): Psoriasis is well controlled on Stelara 45mg every 12 weeks.  He denies any side effects.  Exam just shows two small plaques on the elbows.  Printed order given for his yearly QuantiFERON-TB Gold Plus.  Patient is getting [facility].  Return to clinic in 6 months.\\n\\nCarry forward from (Jun 16 2021): Psoriasis is well controlled on Stelara 45mg every 12 weeks.  He denies any side effects.  Exam just shows small plaques on the right ankle.  Printed order given for his yearly QuantiFERON-TB Gold Plus; he did not get the lab test drawn after last visit.  Patient is getting [facility].  Return to clinic in 6 months\\n\\nCarry forward from (Jan 28 2022): Psoriasis is well controlled on Stelara 45mg every 12 weeks.  He denies any side effects.  Exam just shows no active lesions.  QuantiFERON-TB Gold Plus was negative on 6/16/2021. Due to a change in his insurance, Stelara now has to be sent here to the office for injection in order to be covered by his insurance.  The medication is being supplied [facility]; medication was shipped here earlier this week.  Return to clinic in 3 months.  I instructed patient and [facility] to [facility] to arrange shipment of the medication prior to the next visit.\\n\\nCarry forward from (Apr 29 2022): Patient is here for his Stelara injection.  Condition is well controlled with no active psoriasis plaques on exam. Patient instructed to contact [facility] to have medication shipped to our office prior to follow-up appointment in 12 weeks.\\n\\nCarry forward from (Jul 29 2022): The patient is here for his Stelara injection.  Condition is well controlled with no active psoriasis plaques on the exam. Patient was instructed to contact [facility] to have medication shipped to our office prior to follow-up appointment in 12 weeks.  Patient reports that he had a \"skin reaction\" that he described as hives on the arms and abdomen that appeared 10 days ago while he was in [place].  He went to the ED and was given hydroxyzine.  The eruption has resolved and there is no active hives on exam.\\n\\nCarry forward from (Oct 27 2022): The patient is here for his Stelara injection.  Condition is well controlled with limited active psoriasis plaques on the left forearm and right lower leg. Patient was instructed to [facility] to have medication shipped to our office prior to follow-up appointment in 12 weeks.\\n\\nCarry forward from  (Jan 27 2023): The patient is here for his Stelara injection.  Condition is well controlled with no active psoriasis plaques. Patient was instructed to [facility] to have medication shipped to our office prior to follow-up appointment in 12 weeks. Will plan to get QuantiFERON-TB Gold Plus at next visit.\\n\\nTODAY's note (Apr 20 2023): he patient is here for his Stelara injection.  Condition is well controlled with no active psoriasis plaques. Patient was instructed to [facility] to have medication shipped to our office prior to follow-up appointment in 12 weeks. Printed order given for QuantiFERON-TB Gold Plus.

## 2023-07-13 ENCOUNTER — APPOINTMENT (RX ONLY)
Dept: URBAN - METROPOLITAN AREA CLINIC 158 | Facility: CLINIC | Age: 68
Setting detail: DERMATOLOGY
End: 2023-07-13

## 2023-07-13 DIAGNOSIS — L40.0 PSORIASIS VULGARIS: ICD-10-CM

## 2023-07-13 PROCEDURE — ? STELARA INJECTION

## 2023-07-13 PROCEDURE — ? STELARA MONITORING

## 2023-07-13 PROCEDURE — 99213 OFFICE O/P EST LOW 20 MIN: CPT

## 2023-07-13 PROCEDURE — ? PATIENT SPECIFIC COUNSELING

## 2023-07-13 PROCEDURE — ? COUNSELING

## 2023-07-13 ASSESSMENT — LOCATION ZONE DERM
LOCATION ZONE: ARM
LOCATION ZONE: TRUNK

## 2023-07-13 ASSESSMENT — LOCATION DETAILED DESCRIPTION DERM
LOCATION DETAILED: PERIUMBILICAL SKIN
LOCATION DETAILED: LEFT PROXIMAL DORSAL FOREARM
LOCATION DETAILED: RIGHT PROXIMAL DORSAL FOREARM

## 2023-07-13 ASSESSMENT — LOCATION SIMPLE DESCRIPTION DERM
LOCATION SIMPLE: LEFT FOREARM
LOCATION SIMPLE: ABDOMEN
LOCATION SIMPLE: RIGHT FOREARM

## 2023-07-13 NOTE — PROCEDURE: STELARA INJECTION
Was The Medication Purchased By The Clinic?: No
Ndc (45mg Syringe): [address]-24
Stelara Amount: 45 mg
Render If Medication Purchased By Clinic In Visit Note?: Yes
Consent: The risks of pain and injection site reactions were reviewed with the patient prior to the injection.
Administered By (Optional): Dr. Cruz
Lot # (Optional): [id number]
Expiration Date (Optional): 08/2025
Detail Level: None
Ndc (90mg Syringe): [address]-96
J-Code:

## 2023-07-13 NOTE — PROCEDURE: PATIENT SPECIFIC COUNSELING
Detail Level: Detailed
Carry forward from 1/7/2020: Patient had moved here from [place] in July 2019 and is here to establish dermatologic care.  He has a long-standing history of psoriasis.  He was most recently on Stelara for 2 years under the care of the a dermatologist in [place] with the last injection in June 2019.  His Stelara dose was 45mg and he weighs 160lb.  When he was on Stelara, the psoriasis was under very good control without any side effects.  Prior to Stelara, he had treatment with Enbrel, phototherapy, and a steroid ointment.  Exam show psoriasis plaques over the scalp, face, arms, legs, and back.  There is approximately 30% BSA involved.  Patient wishes to go back on Stelara. I will submit PA/Prescription form into [facility] specialty pharmacy.  Printed order given for CBC. CMP, and Quantiferon Gold TB test.  Return to clinic in 3 months assuming Stelara will be covered.  If [facility] is unable to service the patient, then will need to use [facility] per patient/wife.\\n\\nCarry forward from (May 26 2020): Psoriasis has cleared since he restarted Stelara 45mg every 12 weeks back in Jan 2020.  Exam shows not active psoriasis lesions.  He denies any side effects including no illness, fever, or chills.  Labs from 1/17/2020 reviewed.  He is getting Stelara via [facility] specialty pharmacy.  Stelara approved from 1/16/20 to 1/16/21 (see approval letter in attachement).  Return to clinic in 6 months.\\n\\nCarry forward from (Dec 16 2020): Psoriasis is well controlled on Stelara 45mg every 12 weeks.  He denies any side effects.  Exam just shows two small plaques on the elbows.  Printed order given for his yearly QuantiFERON-TB Gold Plus.  Patient is getting [facility].  Return to clinic in 6 months.\\n\\nCarry forward from (Jun 16 2021): Psoriasis is well controlled on Stelara 45mg every 12 weeks.  He denies any side effects.  Exam just shows small plaques on the right ankle.  Printed order given for his yearly QuantiFERON-TB Gold Plus; he did not get the lab test drawn after last visit.  Patient is getting [facility].  Return to clinic in 6 months\\n\\nCarry forward from (Jan 28 2022): Psoriasis is well controlled on Stelara 45mg every 12 weeks.  He denies any side effects.  Exam just shows no active lesions.  QuantiFERON-TB Gold Plus was negative on 6/16/2021. Due to a change in his insurance, Stelara now has to be sent here to the office for injection in order to be covered by his insurance.  The medication is being [facility]; medication was shipped here earlier this week.  Return to clinic in 3 months.  I instructed patient and [facility] to [facility] to arrange shipment of the medication prior to the next visit.\\n\\nCarry forward from (Apr 29 2022): Patient is here for his Stelara injection.  Condition is well controlled with no active psoriasis plaques on exam. Patient instructed to contact [facility] to have medication shipped to our office prior to follow-up appointment in 12 weeks.\\n\\nCarry forward from (Jul 29 2022): The patient is here for his Stelara injection.  Condition is well controlled with no active psoriasis plaques on the exam. Patient was instructed to contact [facility] to have medication shipped to our office prior to follow-up appointment in 12 weeks.  Patient reports that he had a \"skin reaction\" that he described as hives on the arms and abdomen that appeared 10 days ago while he was in [place].  He went to the ED and was given hydroxyzine.  The eruption has resolved and there is no active hives on exam.\\n\\nCarry forward from (Oct 27 2022): The patient is here for his Stelara injection.  Condition is well controlled with limited active psoriasis plaques on the left forearm and right lower leg. Patient was instructed to contact [facility] to have medication shipped to our office prior to follow-up appointment in 12 weeks.\\n\\nCarry forward from  (Jan 27 2023): The patient is here for his Stelara injection.  Condition is well controlled with no active psoriasis plaques. Patient was instructed to contact [facility] to have medication shipped to our office prior to follow-up appointment in 12 weeks. Will plan to get QuantiFERON-TB Gold Plus at next visit.\\n\\nCarry forward from (Apr 20 2023): The patient is here for his Stelara injection.  Condition is well controlled with no active psoriasis plaques. Patient was instructed to [facility] to have medication shipped to our office prior to follow-up appointment in 12 weeks. Printed order given for QuantiFERON-TB Gold Plus.\\n\\nTODAY's note (Jul 13 2023):  The patient is here for his Stelara injection.  Condition is well controlled with no active psoriasis plaques. Patient was instructed to [facility] to have medication shipped to our office prior to follow-up appointment in 12 weeks. QuantiFERON-TB Gold Plus was negative on 4/21/2023.

## 2023-10-12 ENCOUNTER — APPOINTMENT (RX ONLY)
Dept: URBAN - METROPOLITAN AREA CLINIC 158 | Facility: CLINIC | Age: 68
Setting detail: DERMATOLOGY
End: 2023-10-12

## 2023-10-12 DIAGNOSIS — L40.0 PSORIASIS VULGARIS: ICD-10-CM

## 2023-10-12 PROCEDURE — ? STELARA MONITORING

## 2023-10-12 PROCEDURE — ? STELARA INJECTION

## 2023-10-12 PROCEDURE — ? PATIENT SPECIFIC COUNSELING

## 2023-10-12 PROCEDURE — 99213 OFFICE O/P EST LOW 20 MIN: CPT

## 2023-10-12 PROCEDURE — ? COUNSELING

## 2023-10-12 ASSESSMENT — LOCATION ZONE DERM
LOCATION ZONE: TRUNK
LOCATION ZONE: ARM

## 2023-10-12 ASSESSMENT — LOCATION SIMPLE DESCRIPTION DERM
LOCATION SIMPLE: ABDOMEN
LOCATION SIMPLE: LEFT FOREARM

## 2023-10-12 ASSESSMENT — LOCATION DETAILED DESCRIPTION DERM
LOCATION DETAILED: LEFT PROXIMAL DORSAL FOREARM
LOCATION DETAILED: PERIUMBILICAL SKIN

## 2023-10-12 NOTE — PROCEDURE: PATIENT SPECIFIC COUNSELING
Detail Level: Detailed
Carry forward from 2020: Patient had moved here from [place] in 2019 and is here to establish dermatologic care.  He has a long-standing history of psoriasis.  He was most recently on Stelara for 2 years under the care of the a dermatologist in [place] with the last injection in 2019.  His Stelara dose was 45mg and he weighs 160lb.  When he was on Stelara, the psoriasis was under very good control without any side effects.  Prior to Stelara, he had treatment with Enbrel, phototherapy, and a steroid ointment.  Exam show psoriasis plaques over the scalp, face, arms, legs, and back.  There is approximately 30% BSA involved.  Patient wishes to go back on Stelara. I will submit PA/Prescription form into [facility] specialty pharmacy.  Printed order given for CBC. CMP, and Quantiferon Gold TB test.  Return to clinic in 3 months assuming Stelara will be covered.  If [employer] is unable to service the patient, then will need to use [facility] per patient/wife.\\n\\nCarry forward from (May 26 2020): Psoriasis has cleared since he restarted Stelara 45mg every 12 weeks back in 2020.  Exam shows not active psoriasis lesions.  He denies any side effects including no illness, fever, or chills.  Labs from 2020 reviewed.  He is getting Stelara via [facility] specialty pharmacy.  Stelara approved from 20 to 21 (see approval letter in attachement).  Return to clinic in 6 months.\\n\\nCarry forward from (Dec 16 2020): Psoriasis is well controlled on Stelara 45mg every 12 weeks.  He denies any side effects.  Exam just shows two small plaques on the elbows.  Printed order given for his yearly QuantiFERON-TB Gold Plus.  Patient is [facility].  Return to clinic in 6 months.\\n\\nCarry forward from (2021): Psoriasis is well controlled on Stelara 45mg every 12 weeks.  He denies any side effects.  Exam just shows small plaques on the right ankle.  Printed order given for his yearly QuantiFERON-TB Gold Plus; he did not get the lab test drawn after last visit.  Patient is getting [facility].  Return to clinic in 6 months\\n\\nCarry forward from (2022): Psoriasis is well controlled on Stelara 45mg every 12 weeks.  He denies any side effects.  Exam just shows no active lesions.  QuantiFERON-TB Gold Plus was negative on 2021. Due to a change in his insurance, Stelara now has to be sent here to the office for injection in order to be covered by his insurance.  The medication is being supplied [facility]; medication was shipped here earlier this week.  Return to clinic in 3 months.  I instructed patient and [facility] to [facility] to arrange shipment of the medication prior to the next visit.\\n\\nCarry forward from (2022): Patient is here for his Stelara injection.  Condition is well controlled with no active psoriasis plaques on exam. Patient instructed to [facility] to have medication shipped to our office prior to follow-up appointment in 12 weeks.\\n\\nCarry forward from (2022): The patient is here for his Stelara injection.  Condition is well controlled with no active psoriasis plaques on the exam. Patient was instructed to [facility] to have medication shipped to our office prior to follow-up appointment in 12 weeks.  Patient reports that he had a \"skin reaction\" that he described as hives on the arms and abdomen that appeared 10 days ago while he was in [place].  He went to the ED and was given hydroxyzine.  The eruption has resolved and there is no active hives on exam.\\n\\nCarry forward from (Oct 27 2022): The patient is here for his Stelara injection.  Condition is well controlled with limited active psoriasis plaques on the left forearm and right lower leg. Patient was instructed to [facility] to have medication shipped to our office prior to follow-up appointment in 12 weeks.\\n\\nCarry forward from  (2023): The patient is here for his Stelara injection.  Condition is well controlled with no active psoriasis plaques. Patient was instructed to [facility] to have medication shipped to our office prior to follow-up appointment in 12 weeks. Will plan to get QuantiFERON-TB Gold Plus at next visit.\\n\\nCarry forward from (2023): The patient is here for his Stelara injection.  Condition is well controlled with no active psoriasis plaques. Patient was instructed to [facility] to have medication shipped to our office prior to follow-up appointment in 12 weeks. Printed order given for QuantiFERON-TB Gold Plus.\\n\\nCarry forward from (2023):  The patient is here for his Stelara injection.  Condition is well controlled with no active psoriasis plaques. Patient was instructed to [facility] to have medication shipped to our office prior to follow-up appointment in 12 weeks. QuantiFERON-TB Gold Plus was negative on 2023.\\n\\nTODAY's note (Oct 12 2023): The patient is here for his Stelara injection.  Condition is well controlled with only one smal patch of active psoriasis on the left forearm. He has no itching or burning. Patient was instructed to [facility] to have medication shipped to our office prior to follow-up appointment in 12 weeks. The authorization for Stelara will  on 2023. QuantiFERON-TB Gold Plus was negative on 2023.

## 2023-10-12 NOTE — PROCEDURE: STELARA INJECTION
Was The Medication Purchased By The Clinic?: No
Ndc (45mg Syringe): [address]-29
Stelara Amount: 45 mg
Render If Medication Purchased By Clinic In Visit Note?: Yes
Consent: The risks of pain and injection site reactions were reviewed with the patient prior to the injection.
Administered By (Optional): Dr. Cruz
Lot # (Optional): [id number]
Expiration Date (Optional): 08/2025
Detail Level: None
Ndc (90mg Syringe): [address]-55
J-Code: 
Date Of Next Injection: 12 Weeks
Ndc (Pediatric Vial 45mg/05ml): 95231-352-95

## 2024-01-12 ENCOUNTER — APPOINTMENT (RX ONLY)
Dept: URBAN - METROPOLITAN AREA CLINIC 158 | Facility: CLINIC | Age: 69
Setting detail: DERMATOLOGY
End: 2024-01-12

## 2024-01-12 DIAGNOSIS — L40.0 PSORIASIS VULGARIS: ICD-10-CM

## 2024-01-12 PROCEDURE — 99213 OFFICE O/P EST LOW 20 MIN: CPT

## 2024-01-12 PROCEDURE — ? STELARA MONITORING

## 2024-01-12 PROCEDURE — ? COUNSELING

## 2024-01-12 PROCEDURE — ? PATIENT SPECIFIC COUNSELING

## 2024-01-12 PROCEDURE — ? EDUCATIONAL RESOURCES PROVIDED

## 2024-01-12 ASSESSMENT — LOCATION ZONE DERM: LOCATION ZONE: SCALP

## 2024-01-12 ASSESSMENT — LOCATION DETAILED DESCRIPTION DERM: LOCATION DETAILED: RIGHT OCCIPITAL SCALP

## 2024-01-12 ASSESSMENT — LOCATION SIMPLE DESCRIPTION DERM: LOCATION SIMPLE: POSTERIOR SCALP

## 2024-01-12 NOTE — PROCEDURE: PATIENT SPECIFIC COUNSELING
Detail Level: Detailed
Carry forward from 2020: Patient had moved here from [place] in 2019 and is here to establish dermatologic care.  He has a long-standing history of psoriasis.  He was most recently on Stelara for 2 years under the care of the a dermatologist in [place] with the last injection in 2019.  His Stelara dose was 45mg and he weighs 160lb.  When he was on Stelara, the psoriasis was under very good control without any side effects.  Prior to Stelara, he had treatment with Enbrel, phototherapy, and a steroid ointment.  Exam show psoriasis plaques over the scalp, face, arms, legs, and back.  There is approximately 30% BSA involved.  Patient wishes to go back on Stelara. I will submit PA/Prescription form into [facility] specialty pharmacy.  Printed order given for CBC. CMP, and Quantiferon Gold TB test.  Return to clinic in 3 months assuming Stelara will be covered.  If [employer] is unable to service the patient, then will need to use [facility] per patient/wife.\\n\\nCarry forward from (May 26 2020): Psoriasis has cleared since he restarted Stelara 45mg every 12 weeks back in 2020.  Exam shows not active psoriasis lesions.  He denies any side effects including no illness, fever, or chills.  Labs from 2020 reviewed.  He is getting Stelara via [facility] pharmacy.  Stelara approved from 20 to 21 (see approval letter in attachement).  Return to clinic in 6 months.\\n\\nCarry forward from (Dec 16 2020): Psoriasis is well controlled on Stelara 45mg every 12 weeks.  He denies any side effects.  Exam just shows two small plaques on the elbows.  Printed order given for his yearly QuantiFERON-TB Gold Plus.  [facility]  Return to clinic in 6 months.\\n\\nCarry forward from (2021): Psoriasis is well controlled on Stelara 45mg every 12 weeks.  He denies any side effects.  Exam just shows small plaques on the right ankle.  Printed order given for his yearly QuantiFERON-TB Gold Plus; he did not get the lab test drawn after last visit.  Patient is [facility].  Return to clinic in 6 months\\n\\nCarry forward from (2022): Psoriasis is well controlled on Stelara 45mg every 12 weeks.  He denies any side effects.  Exam just shows no active lesions.  QuantiFERON-TB Gold Plus was negative on 2021. Due to a change in his insurance, Stelara now has to be sent here to the office for injection in order to be covered by his insurance.  The medication is being [facility]; medication was shipped here earlier this week.  Return to clinic in 3 months.  I instructed [facility] and [facility] of the medication prior to the next visit.\\n\\nCarry forward from (2022): Patient is here for his Stelara injection.  Condition is well controlled with no active psoriasis plaques on exam. Patient instructed to [facility] to have medication shipped to our office prior to follow-up appointment in 12 weeks.\\n\\nCarry forward from (2022): The patient is here for his Stelara injection.  Condition is well controlled with no active psoriasis plaques on the exam. Patient was instructed to [facility] to have medication shipped to our office prior to follow-up appointment in 12 weeks.  Patient reports that he had a \"skin reaction\" that he described as hives on the arms and abdomen that appeared 10 days ago while he was in [place].  He went to the ED and was given hydroxyzine.  The eruption has resolved and there is no active hives on exam.\\n\\nCarry forward from (Oct 27 2022): The patient is here for his Stelara injection.  Condition is well controlled with limited active psoriasis plaques on the left forearm and right lower leg. Patient was instructed [facility] to have medication shipped to our office prior to follow-up appointment in 12 weeks.\\n\\nCarry forward from  (2023): The patient is here for his Stelara injection.  Condition is well controlled with no active psoriasis plaques. Patient was instructed to [facility] to have medication shipped to our office prior to follow-up appointment in 12 weeks. Will plan to get QuantiFERON-TB Gold Plus at next visit.\\n\\nCarry forward from (2023): The patient is here for his Stelara injection.  Condition is well controlled with no active psoriasis plaques. Patient was instructed to [facility] to have medication shipped to our office prior to follow-up appointment in 12 weeks. Printed order given for QuantiFERON-TB Gold Plus.\\n\\nCarry forward from (2023):  The patient is here for his Stelara injection.  Condition is well controlled with no active psoriasis plaques. Patient was instructed to [facility] to have medication shipped to our office prior to follow-up appointment in 12 weeks. QuantiFERON-TB Gold Plus was negative on 2023.\\n\\nCarry forward from (Oct 12 2023): The patient is here for his Stelara injection.  Condition is well controlled with only one smal patch of active psoriasis on the left forearm. He has no itching or burning. Patient was instructed [facility] to have medication shipped to our office prior to follow-up appointment in 12 weeks. The authorization for Stelara will  on 2023. QuantiFERON-TB Gold Plus was negative on 2023.\\n\\nTODAY's note (2024):  The patient is here for his Stelara injection.  Condition is well controlled with only one small patch of active psoriasis on the right posterior scalp. He has no itching or burning.  He did change his insurance from Medicare with [employer] supplemental to a [employer] Medicare part C plan for . The patient was [facility] with the medication shipped to our office before follow-up appointments. His new plan requires him to use [facility] specialty pharmacy.  He or his wife will call [employer] to arrange shipment. They don't know if the copay will increase or not or if the medication can be shipped to his home instead of to our office. If the copay with his new insurance plan is too cost-prohibitive, then could consider [employer]; they will let me know what they find out from [facility] pharmacy. QuantiFERON-TB Gold Plus was negative on 2023. Return to clinic in 12 weeks.

## 2024-10-03 ENCOUNTER — APPOINTMENT (RX ONLY)
Dept: URBAN - METROPOLITAN AREA CLINIC 158 | Facility: CLINIC | Age: 69
Setting detail: DERMATOLOGY
End: 2024-10-03

## 2024-10-03 VITALS — WEIGHT: 159 LBS

## 2024-10-03 DIAGNOSIS — L40.0 PSORIASIS VULGARIS: ICD-10-CM | Status: INADEQUATELY CONTROLLED

## 2024-10-03 PROCEDURE — ? STELARA MONITORING

## 2024-10-03 PROCEDURE — ? COUNSELING

## 2024-10-03 PROCEDURE — 99214 OFFICE O/P EST MOD 30 MIN: CPT

## 2024-10-03 PROCEDURE — ? PATIENT SPECIFIC COUNSELING

## 2024-10-03 PROCEDURE — ? PRESCRIPTION

## 2024-10-03 RX ORDER — USTEKINUMAB 45 MG/.5ML
1 INJECTION, SOLUTION SUBCUTANEOUS
Qty: 1 | Refills: 12 | Status: ERX

## 2024-10-03 ASSESSMENT — LOCATION DETAILED DESCRIPTION DERM
LOCATION DETAILED: LEFT DISTAL DORSAL FOREARM
LOCATION DETAILED: LEFT KNEE
LOCATION DETAILED: RIGHT BUTTOCK
LOCATION DETAILED: RIGHT KNEE
LOCATION DETAILED: LEFT DISTAL POSTERIOR THIGH
LOCATION DETAILED: RIGHT OCCIPITAL SCALP
LOCATION DETAILED: RIGHT PROXIMAL DORSAL FOREARM
LOCATION DETAILED: LEFT BUTTOCK
LOCATION DETAILED: LEFT OCCIPITAL SCALP
LOCATION DETAILED: RIGHT ANTERIOR DISTAL THIGH
LOCATION DETAILED: LEFT DISTAL PRETIBIAL REGION
LOCATION DETAILED: RIGHT DISTAL PRETIBIAL REGION
LOCATION DETAILED: LEFT ANTERIOR DISTAL THIGH

## 2024-10-03 ASSESSMENT — LOCATION SIMPLE DESCRIPTION DERM
LOCATION SIMPLE: LEFT FOREARM
LOCATION SIMPLE: RIGHT PRETIBIAL REGION
LOCATION SIMPLE: LEFT POSTERIOR THIGH
LOCATION SIMPLE: POSTERIOR SCALP
LOCATION SIMPLE: RIGHT FOREARM
LOCATION SIMPLE: LEFT KNEE
LOCATION SIMPLE: LEFT PRETIBIAL REGION
LOCATION SIMPLE: RIGHT THIGH
LOCATION SIMPLE: RIGHT KNEE
LOCATION SIMPLE: LEFT BUTTOCK
LOCATION SIMPLE: RIGHT BUTTOCK
LOCATION SIMPLE: LEFT THIGH

## 2024-10-03 ASSESSMENT — LOCATION ZONE DERM
LOCATION ZONE: LEG
LOCATION ZONE: TRUNK
LOCATION ZONE: ARM
LOCATION ZONE: SCALP

## 2024-10-03 ASSESSMENT — PGA PSORIASIS: PGA PSORIASIS 2020: SEVERE

## 2024-10-03 ASSESSMENT — BSA PSORIASIS: % BODY COVERED IN PSORIASIS: 25

## 2024-10-03 NOTE — PROCEDURE: PATIENT SPECIFIC COUNSELING
Detail Level: Simple
Carry forward from 2020: Patient had moved here from [place] in 2019 and is here to establish dermatologic care.  He has a long-standing history of psoriasis.  He was most recently on Stelara for 2 years under the care of the a dermatologist in [place] with the last injection in 2019.  His Stelara dose was 45mg and he weighs 160lb.  When he was on Stelara, the psoriasis was under very good control without any side effects.  Prior to Stelara, he had treatment with Enbrel, phototherapy, and a steroid ointment.  Exam show psoriasis plaques over the scalp, face, arms, legs, and back.  There is approximately 30% BSA involved.  Patient wishes to go back on Stelara. I will submit PA/Prescription form into [facility] specialty pharmacy.  Printed order given for CBC. CMP, and Quantiferon Gold TB test.  Return to clinic in 3 months assuming Stelara will be covered.  If [employer] is unable to service the patient, then will need to use [facility] per patient/wife.\\n\\nCarry forward from (May 26 2020): Psoriasis has cleared since he restarted Stelara 45mg every 12 weeks back in 2020.  Exam shows not active psoriasis lesions.  He denies any side effects including no illness, fever, or chills.  Labs from 2020 reviewed.  He is getting Stelara via [facility] pharmacy.  Stelara approved from 20 to 21 (see approval letter in attachement).  Return to clinic in 6 months.\\n\\nCarry forward from (Dec 16 2020): Psoriasis is well controlled on Stelara 45mg every 12 weeks.  He denies any side effects.  Exam just shows two small plaques on the elbows.  Printed order given for his yearly QuantiFERON-TB Gold Plus.  [facility].  Return to clinic in 6 months.\\n\\nCarry forward from (2021): Psoriasis is well controlled on Stelara 45mg every 12 weeks.  He denies any side effects.  Exam just shows small plaques on the right ankle.  Printed order given for his yearly QuantiFERON-TB Gold Plus; he did not get the lab test drawn after last visit.  Patient is getting [facility].  Return to clinic in 6 months\\n\\nCarry forward from (2022): Psoriasis is well controlled on Stelara 45mg every 12 weeks.  He denies any side effects.  Exam just shows no active lesions.  QuantiFERON-TB Gold Plus was negative on 2021. Due to a change in his insurance, Stelara now has to be sent here to the office for injection in order to be covered by his insurance.  The [facility] is [facility]; medication was shipped here earlier this week.  Return to clinic in 3 months.  I instructed [facility] and [facility] to [facility] of the medication prior to the next visit.\\n\\nCarry forward from (2022): Patient is here for his Stelara injection.  Condition is well controlled with no active psoriasis plaques on exam. Patient instructed to [facility] to have medication shipped to our office prior to follow-up appointment in 12 weeks.\\n\\nCarry forward from (2022): The patient is here for his Stelara injection.  Condition is well controlled with no active psoriasis plaques on the exam. Patient was instructed to [facility] to have medication shipped to our office prior to follow-up appointment in 12 weeks.  Patient reports that he had a \"skin reaction\" that he described as hives on the arms and abdomen that appeared 10 days ago while he was in [place].  He went to the ED and was given hydroxyzine.  The eruption has resolved and there is no active hives on exam.\\n\\nCarry forward from (Oct 27 2022): The patient is here for his Stelara injection.  Condition is well controlled with limited active psoriasis plaques on the left forearm and right lower leg. Patient was instructed [facility] to have medication shipped to our office prior to follow-up appointment in 12 weeks.\\n\\nCarry forward from  (2023): The patient is here for his Stelara injection.  Condition is well controlled with no active psoriasis plaques. Patient was instructed to [facility] to have medication shipped to our office prior to follow-up appointment in 12 weeks. Will plan to get QuantiFERON-TB Gold Plus at next visit.\\n\\nCarry forward from (2023): The patient is here for his Stelara injection.  Condition is well controlled with no active psoriasis plaques. Patient was instructed to [facility] to have medication shipped to our office prior to follow-up appointment in 12 weeks. Printed order given for QuantiFERON-TB Gold Plus.\\n\\nCarry forward from (2023):  The patient is here for his Stelara injection.  Condition is well controlled with no active psoriasis plaques. Patient was instructed to [facility] to have medication shipped to our office prior to follow-up appointment in 12 weeks. QuantiFERON-TB Gold Plus was negative on 2023.\\n\\nCarry forward from (Oct 12 2023): The patient is here for his Stelara injection.  Condition is well controlled with only one smal patch of active psoriasis on the left forearm. He has no itching or burning. Patient was instructed [facility] to have medication shipped to our office prior to follow-up appointment in 12 weeks. The authorization for Stelara will  on 2023. QuantiFERON-TB Gold Plus was negative on 2023.\\n\\nCarry forward from (2024):  The patient is here for his Stelara injection.  Condition is well controlled with only one small patch of active psoriasis on the right posterior scalp. He has no itching or burning.  He did change his insurance from Medicare with [employer] supplemental to a [employer] Medicare part C plan for . The patient [facility] with the medication shipped to our office before follow-up appointments. His new plan requires him to use [facility] pharmacy.  He or his wife will call [employer] to arrange shipment. They don't know if the copay will increase or not or if the medication can be shipped to his home instead of to our office. If the copay with his new insurance plan is too cost-prohibitive, then could consider [facility]; they will let me know what they find out from [facility]. QuantiFERON-TB Gold Plus was negative on 2023. Return to clinic in 12 weeks.\\n\\nTODAY's note (Oct 03 2024): The patient's last dose of Stelara was on 2024 given his last visit.  His psoriasis is flaring on the scalp, arms, legs, and buttocks.  BSA is 25%.  He states that his wife had contacted the specialty pharmacy for the Stelara prescription and was told to fill out the Stelar[employer]ithme enrollment form and fax it to the program; he brought in this form today.  His wife has been handling getting the medication coordinated with the pharmacy and he is unsure about the status of his coverage.  We do have an approval letter from [facility] from 2023 which showed approval of Stelara through 2024.  He does not know if he had changed his insurance policy at the beginning of this calendar year and his wife did not accompany him to today's appointment.  I will fill out the Stelarawithme enrollment form and fax it to [facility].  I will also send a prescription for Stelara to [facility] to start PA process.  Follow-up TB based upon Stelara approval.

## 2024-10-18 ENCOUNTER — RX ONLY (OUTPATIENT)
Age: 69
Setting detail: RX ONLY
End: 2024-10-18

## 2024-10-18 RX ORDER — USTEKINUMAB 45 MG/.5ML
1 INJECTION, SOLUTION SUBCUTANEOUS
Qty: 1 | Refills: 11 | Status: ERX

## 2024-10-21 ENCOUNTER — RX ONLY (OUTPATIENT)
Age: 69
Setting detail: RX ONLY
End: 2024-10-21

## 2024-10-21 RX ORDER — USTEKINUMAB 45 MG/.5ML
1 INJECTION, SOLUTION SUBCUTANEOUS
Qty: 1 | Refills: 11 | Status: ERX